# Patient Record
Sex: FEMALE | Race: WHITE | NOT HISPANIC OR LATINO | Employment: OTHER | URBAN - METROPOLITAN AREA
[De-identification: names, ages, dates, MRNs, and addresses within clinical notes are randomized per-mention and may not be internally consistent; named-entity substitution may affect disease eponyms.]

---

## 2019-10-16 ENCOUNTER — HOSPITAL ENCOUNTER (EMERGENCY)
Facility: HOSPITAL | Age: 54
Discharge: HOME | End: 2019-10-16
Attending: EMERGENCY MEDICINE | Admitting: EMERGENCY MEDICINE
Payer: COMMERCIAL

## 2019-10-16 VITALS
HEART RATE: 88 BPM | BODY MASS INDEX: 32.2 KG/M2 | DIASTOLIC BLOOD PRESSURE: 92 MMHG | TEMPERATURE: 98.2 F | WEIGHT: 175 LBS | SYSTOLIC BLOOD PRESSURE: 154 MMHG | HEIGHT: 62 IN | OXYGEN SATURATION: 99 % | RESPIRATION RATE: 18 BRPM

## 2019-10-16 DIAGNOSIS — T78.40XA ALLERGIC REACTION, INITIAL ENCOUNTER: Primary | ICD-10-CM

## 2019-10-16 PROCEDURE — 99283 EMERGENCY DEPT VISIT LOW MDM: CPT

## 2019-10-16 PROCEDURE — 96372 THER/PROPH/DIAG INJ SC/IM: CPT

## 2019-10-16 RX ORDER — DIPHENHYDRAMINE HCL 25 MG
50 TABLET ORAL ONCE
Status: COMPLETED | OUTPATIENT
Start: 2019-10-16 | End: 2019-10-16

## 2019-10-16 RX ORDER — LANOLIN ALCOHOL/MO/W.PET/CERES
1000 CREAM (GRAM) TOPICAL DAILY
COMMUNITY

## 2019-10-16 RX ORDER — HYDROXYZINE HYDROCHLORIDE 25 MG/1
25 TABLET, FILM COATED ORAL ONCE
Status: DISCONTINUED | OUTPATIENT
Start: 2019-10-16 | End: 2019-10-16

## 2019-10-16 RX ORDER — FLUTICASONE PROPIONATE 50 MCG
1 SPRAY, SUSPENSION (ML) NASAL
COMMUNITY
Start: 2014-07-21

## 2019-10-16 RX ORDER — HYDROXYZINE PAMOATE 25 MG/1
25 CAPSULE ORAL
COMMUNITY
Start: 2007-11-05

## 2019-10-16 RX ORDER — DIPHENHYDRAMINE HCL 25 MG
25 TABLET ORAL EVERY 6 HOURS
Qty: 20 TABLET | Refills: 0 | Status: SHIPPED | OUTPATIENT
Start: 2019-10-16 | End: 2019-10-21

## 2019-10-16 RX ORDER — AMOXICILLIN 500 MG
CAPSULE ORAL
COMMUNITY

## 2019-10-16 RX ORDER — CLONAZEPAM 0.5 MG/1
1.5 TABLET ORAL DAILY
COMMUNITY
Start: 2015-05-05

## 2019-10-16 RX ORDER — DEXAMETHASONE SODIUM PHOSPHATE 4 MG/ML
10 INJECTION, SOLUTION INTRA-ARTICULAR; INTRALESIONAL; INTRAMUSCULAR; INTRAVENOUS; SOFT TISSUE ONCE
Status: COMPLETED | OUTPATIENT
Start: 2019-10-16 | End: 2019-10-16

## 2019-10-16 RX ORDER — LISINOPRIL AND HYDROCHLOROTHIAZIDE 25; 20 MG/1; MG/1
1 TABLET ORAL
COMMUNITY
Start: 2017-10-04

## 2019-10-16 RX ORDER — CALCIUM CARBONATE 300MG(750)
400 TABLET,CHEWABLE ORAL DAILY
COMMUNITY

## 2019-10-16 RX ORDER — TRAZODONE HYDROCHLORIDE 300 MG/1
300 TABLET ORAL
COMMUNITY

## 2019-10-16 RX ORDER — BACLOFEN 10 MG/1
TABLET ORAL
COMMUNITY
Start: 2017-10-16

## 2019-10-16 RX ORDER — ATORVASTATIN CALCIUM 20 MG/1
40 TABLET, FILM COATED ORAL DAILY
COMMUNITY
Start: 2018-10-01

## 2019-10-16 RX ORDER — SERTRALINE HYDROCHLORIDE 100 MG/1
200 TABLET, FILM COATED ORAL
COMMUNITY
Start: 2015-05-05

## 2019-10-16 RX ORDER — CELECOXIB 200 MG/1
200 CAPSULE ORAL
COMMUNITY
Start: 2012-06-06

## 2019-10-16 RX ORDER — DIVALPROEX SODIUM 500 MG/1
1000 TABLET, DELAYED RELEASE ORAL EVERY 12 HOURS SCHEDULED
COMMUNITY

## 2019-10-16 RX ORDER — MAGNESIUM GLUCONATE 30 MG(550)
2 TABLET ORAL DAILY
COMMUNITY
Start: 2016-07-26

## 2019-10-16 RX ADMIN — DIPHENHYDRAMINE HCL 50 MG: 25 TABLET ORAL at 01:49

## 2019-10-16 RX ADMIN — DEXAMETHASONE SODIUM PHOSPHATE 10 MG: 4 INJECTION, SOLUTION INTRA-ARTICULAR; INTRALESIONAL; INTRAMUSCULAR; INTRAVENOUS; SOFT TISSUE at 01:49

## 2019-10-16 NOTE — ED PROVIDER NOTES
History  Chief Complaint   Patient presents with    Allergic Reaction     Pt called this ER earlier  Pt states she is camping and has an itchy rash generalized over her body that worsened after showering  Pt took Benadryl 50mg PO at home with little relief  61-year-old female presenting with a diffuse body rash which started approximately 7:00 p m  After she touch to latex gloves which she has an allergy to  States she tried taking Benadryl which was  but it did not improve her symptoms  She subsequently took a shower which also did not improve her symptoms  She denies any shortness of breath, nausea or vomiting, abdominal pain, stridor, neck swelling, tongue swelling  History provided by:  Patient   used: No    Allergic Reaction   Presenting symptoms: itching and rash    Presenting symptoms: no difficulty breathing, no difficulty swallowing, no swelling and no wheezing    Severity:  Moderate  Context comment:  Latex  Ineffective treatments:  Antihistamines      Prior to Admission Medications   Prescriptions Last Dose Informant Patient Reported? Taking?    Biotin 5000 MCG CAPS   Yes Yes   Sig: Take by mouth   Cholecalciferol (VITAMIN D3) 5000 units TABS   Yes No   Sig: Take 5,000 Units by mouth daily   Magnesium 400 MG TABS   Yes No   Sig: Take 400 mg by mouth daily   Multiple Vitamins-Minerals (MULTIVITAMIN PO)   Yes No   Sig: Take 1 tablet by mouth daily   Omega-3 Fatty Acids (FISH OIL) 1200 MG CAPS   Yes Yes   Sig: Take by mouth   Potassium Gluconate 595 (99 K) MG TABS   Yes Yes   Sig: Take 2 tablets by mouth daily   atorvastatin (LIPITOR) 20 mg tablet   Yes Yes   Si mg daily   baclofen 10 mg tablet   Yes Yes   Sig: PRN   celecoxib (CeleBREX) 200 mg capsule   Yes Yes   Sig: Take 200 mg by mouth   clonazePAM (KlonoPIN) 0 5 mg tablet   Yes Yes   Sig: Take 1 5 mg by mouth daily   divalproex sodium (DEPAKOTE) 500 mg EC tablet   Yes Yes   Sig: Take 1,000 mg by mouth every 12 (twelve) hours   fluticasone (FLONASE) 50 mcg/act nasal spray   Yes Yes   Si spray into each nostril   hydrOXYzine pamoate (VISTARIL) 25 mg capsule   Yes Yes   Sig: Take 25 mg by mouth   lisinopril-hydrochlorothiazide (PRINZIDE,ZESTORETIC) 20-25 MG per tablet   Yes Yes   Si tablet   sertraline (ZOLOFT) 100 mg tablet   Yes Yes   Sig: Take 200 mg by mouth   traZODone (DESYREL) 300 MG tablet   Yes No   Sig: Take 300 mg by mouth   vitamin B-12 (VITAMIN B-12) 1,000 mcg tablet   Yes Yes   Sig: Take 1,000 mcg by mouth daily      Facility-Administered Medications: None       Past Medical History:   Diagnosis Date    Anxiety     Arthritis     Depression     Diabetes mellitus (HCC)     Fibromyalgia     GERD (gastroesophageal reflux disease)     Hypertension     Migraine     PTSD (post-traumatic stress disorder)     Seasonal allergies        Past Surgical History:   Procedure Laterality Date    CHOLECYSTECTOMY      HYSTERECTOMY      RECTAL SURGERY      TONSILLECTOMY      WRIST SURGERY Right        History reviewed  No pertinent family history  I have reviewed and agree with the history as documented  Social History     Tobacco Use    Smoking status: Current Every Day Smoker     Packs/day: 0 20     Types: Cigarettes    Smokeless tobacco: Never Used   Substance Use Topics    Alcohol use: Yes     Comment: Occasional    Drug use: Yes     Types: Methamphetamines     Comment: Medical        Review of Systems   Constitutional: Negative for chills and fever  HENT: Negative for congestion, drooling, ear pain, rhinorrhea, sneezing, sore throat, trouble swallowing and voice change  Eyes: Negative for pain, redness and itching  Respiratory: Negative for cough, choking, chest tightness, shortness of breath, wheezing and stridor  Cardiovascular: Negative for chest pain  Genitourinary: Negative for dysuria and frequency  Musculoskeletal: Negative for arthralgias, joint swelling and myalgias  Skin: Positive for itching and rash  Allergic/Immunologic: Negative for immunocompromised state  Neurological: Negative for dizziness, light-headedness and headaches  Psychiatric/Behavioral: The patient is nervous/anxious  Physical Exam  Physical Exam   Constitutional: She is oriented to person, place, and time  She appears well-developed and well-nourished  She appears distressed (Patient appears very anxious about her condition)  HENT:   Head: Normocephalic and atraumatic  Right Ear: External ear normal    Left Ear: External ear normal    Mouth/Throat: Oropharynx is clear and moist    Oropharynx is clear  The uvula is within the midline  The tongue and lips are within normal limits and without swelling  No stridor  No drooling  Eyes: Pupils are equal, round, and reactive to light  Conjunctivae and EOM are normal    Neck: Normal range of motion  Neck supple  No tracheal deviation present  No thyromegaly present  Cardiovascular: Normal rate, regular rhythm and intact distal pulses  No murmur heard  Pulmonary/Chest: Effort normal and breath sounds normal  No stridor  Abdominal: Soft  She exhibits no distension  There is no tenderness  There is no rebound and no guarding  Musculoskeletal: Normal range of motion  She exhibits no edema or tenderness  No obvious deformities on exposed limbs identified  Patient is ambulatory  Lymphadenopathy:     She has no cervical adenopathy  Neurological: She is alert and oriented to person, place, and time  Moves all extremities without difficulty   Skin: Capillary refill takes less than 2 seconds  She is not diaphoretic  Diffuse urticarial rash around trunk, upper and lower extremities, face  Rash is blanching  It spares the palms and the soles  No petechiae  Psychiatric: She has a normal mood and affect  Nursing note and vitals reviewed        Vital Signs  ED Triage Vitals [10/16/19 0106]   Temperature Pulse Respirations Blood Pressure SpO2   98 2 °F (36 8 °C) 88 18 154/92 99 %      Temp Source Heart Rate Source Patient Position - Orthostatic VS BP Location FiO2 (%)   Temporal Monitor -- Left arm --      Pain Score       No Pain           Vitals:    10/16/19 0106   BP: 154/92   Pulse: 88         Visual Acuity      ED Medications  Medications   dexamethasone (DECADRON) injection 10 mg (10 mg Intramuscular Given 10/16/19 0149)   diphenhydrAMINE (BENADRYL) tablet 50 mg (50 mg Oral Given 10/16/19 0149)       Diagnostic Studies  Results Reviewed     None                 No orders to display              Procedures  Procedures       ED Course                               MDM  Number of Diagnoses or Management Options  Allergic reaction, initial encounter:   Diagnosis management comments: Presenting symptoms consistent with allergic reaction  There is no airway involvement nor difficulty swallowing  Patient looks great  The patient is tolerating fluids  The patient looks great, the findings are minimal and due to nonprogression of symptoms here the patient is safe to discharge home  The patient feels comfortable with plan and will return immediately if symptoms begin to worsen  The rash is not consistent with erythema muliforme at this time  The patient is to continue histamine 1 and 2 blockade  She received Decadron prior to discharge  The patient was instructed to avoid potential precipitating factor and to follow up with their regular physician within 2 days for recheck and up with allergy specialist for definitive allergy testing  The patient agrees with plan  Risk of Complications, Morbidity, and/or Mortality  Presenting problems: low  Diagnostic procedures: low  Management options: low    Patient Progress  Patient progress: stable      Disposition  Final diagnoses:    Allergic reaction, initial encounter     Time reflects when diagnosis was documented in both MDM as applicable and the Disposition within this note     Time User Action Codes Description Comment    10/16/2019  2:38 AM Mattie Parker Add [[id number]] Allergic reaction, initial encounter       ED Disposition     ED Disposition Condition Date/Time Comment    Discharge Stable Wed Oct 16, 2019  2:38 AM Harrison Ugalde discharge to home/self care              Follow-up Information     Follow up With Specialties Details Why Contact Bennie Mcintosh  In 2 days  [address] [facility],[address] [facility] [address]            Discharge Medication List as of 10/16/2019  2:40 AM      START taking these medications    Details   diphenhydrAMINE (BENADRYL) 25 mg tablet Take 1 tablet (25 mg total) by mouth every 6 (six) hours for 5 days, Starting Wed 10/16/2019, Until Mon 10/21/2019, Print         CONTINUE these medications which have NOT CHANGED    Details   atorvastatin (LIPITOR) 20 mg tablet 40 mg daily, Starting Mon 10/1/2018, Historical Med      baclofen 10 mg tablet PRN, Historical Med      Biotin 5000 MCG CAPS Take by mouth, Historical Med      celecoxib (CeleBREX) 200 mg capsule Take 200 mg by mouth, Starting Wed 6/6/2012, Historical Med      clonazePAM (KlonoPIN) 0 5 mg tablet Take 1 5 mg by mouth daily, Starting Tue 5/5/2015, Historical Med      divalproex sodium (DEPAKOTE) 500 mg EC tablet Take 1,000 mg by mouth every 12 (twelve) hours, Historical Med      fluticasone (FLONASE) 50 mcg/act nasal spray 1 spray into each nostril, Starting Mon 7/21/2014, Historical Med      hydrOXYzine pamoate (VISTARIL) 25 mg capsule Take 25 mg by mouth, Starting Mon 11/5/2007, Historical Med      lisinopril-hydrochlorothiazide (PRINZIDE,ZESTORETIC) 20-25 MG per tablet 1 tablet, Starting Wed 10/4/2017, Historical Med      Omega-3 Fatty Acids (FISH OIL) 1200 MG CAPS Take by mouth, Historical Med      Potassium Gluconate 595 (99 K) MG TABS Take 2 tablets by mouth daily, Starting Tue 7/26/2016, Historical Med      sertraline (ZOLOFT) 100 mg tablet Take 200 mg by mouth, Starting Tue 5/5/2015, Historical Med      vitamin B-12 (VITAMIN B-12) 1,000 mcg tablet Take 1,000 mcg by mouth daily, Historical Med      Cholecalciferol (VITAMIN D3) 5000 units TABS Take 5,000 Units by mouth daily, Historical Med      Magnesium 400 MG TABS Take 400 mg by mouth daily, Historical Med      Multiple Vitamins-Minerals (MULTIVITAMIN PO) Take 1 tablet by mouth daily, Historical Med      traZODone (DESYREL) 300 MG tablet Take 300 mg by mouth, Historical Med           No discharge procedures on file      ED Provider  Electronically Signed by           Kimani Becerril DO  10/16/19 7842